# Patient Record
Sex: FEMALE | Race: WHITE | NOT HISPANIC OR LATINO | Employment: FULL TIME | URBAN - NONMETROPOLITAN AREA
[De-identification: names, ages, dates, MRNs, and addresses within clinical notes are randomized per-mention and may not be internally consistent; named-entity substitution may affect disease eponyms.]

---

## 2022-09-03 ENCOUNTER — OFFICE VISIT (OUTPATIENT)
Dept: URGENT CARE | Facility: MEDICAL CENTER | Age: 57
End: 2022-09-03
Payer: COMMERCIAL

## 2022-09-03 VITALS
HEART RATE: 74 BPM | OXYGEN SATURATION: 99 % | WEIGHT: 113.4 LBS | TEMPERATURE: 97.6 F | RESPIRATION RATE: 20 BRPM | DIASTOLIC BLOOD PRESSURE: 91 MMHG | SYSTOLIC BLOOD PRESSURE: 159 MMHG

## 2022-09-03 DIAGNOSIS — H01.004 BLEPHARITIS OF LEFT UPPER EYELID, UNSPECIFIED TYPE: Primary | ICD-10-CM

## 2022-09-03 PROCEDURE — 99203 OFFICE O/P NEW LOW 30 MIN: CPT | Performed by: PHYSICIAN ASSISTANT

## 2022-09-03 RX ORDER — ESCITALOPRAM OXALATE 5 MG/1
5 TABLET ORAL DAILY
COMMUNITY

## 2022-09-03 RX ORDER — ERYTHROMYCIN 5 MG/G
0.5 OINTMENT OPHTHALMIC EVERY 12 HOURS SCHEDULED
Qty: 14 G | Refills: 0 | Status: SHIPPED | OUTPATIENT
Start: 2022-09-03 | End: 2022-09-10

## 2022-09-03 RX ORDER — OFLOXACIN 3 MG/ML
1 SOLUTION/ DROPS OPHTHALMIC 4 TIMES DAILY
Qty: 5 ML | Refills: 0 | Status: SHIPPED | OUTPATIENT
Start: 2022-09-03 | End: 2022-09-03

## 2022-09-03 NOTE — PATIENT INSTRUCTIONS
[facility]  Address: [address] [place] [address] [place], [place], [address]   Phone: [phone]    Continue to monitor symptoms  If new or worsening symptoms develop, go immediately to [facility]  Drink plenty of fluids  Follow up with Eye Doctor this week  Blepharitis   WHAT YOU NEED TO KNOW:   Blepharitis is inflammation of one or both eyelids  Your eyelid, eyelashes, oil glands, or whites of the eye may be affected  DISCHARGE INSTRUCTIONS:   Return to the emergency department if:   You have severe pain  You have vision loss  Call your doctor or ophthalmologist if:   Your vision changes  Your signs and symptoms return or get worse, even after treatment  You have a lump on your eyelid  You have a pus-filled sore on your eyelid  You have questions or concerns about your condition or care  Medicines:   Medicines  can help decrease pain and swelling, or treat an infection  Take your medicine as directed  Contact your healthcare provider if you think your medicine is not helping or if you have side effects  Tell him or her if you are allergic to any medicine  Keep a list of the medicines, vitamins, and herbs you take  Include the amounts, and when and why you take them  Bring the list or the pill bottles to follow-up visits  Carry your medicine list with you in case of an emergency  Manage blepharitis:  Always wash your hands with soap and water before and after eye care:     Use artificial tears  2 times each day if you have dry eye  Apply a warm compress  for 10 minutes 1 to 2 times each day, or as directed  This will loosen crusts and decrease itching and burning  Gently scrub your upper and lower eyelid  2 times each day  This will help open your clogged oil glands and remove pus or other material stuck to your eyelid  Your healthcare provider may recommend a cleaning solution to buy   You can instead make one by putting 2 to 3 drops of baby shampoo in ½ cup warm water     Massage your upper and lower eyelid in small circles for 5 seconds  to loosen oil plugs and decrease inflammation  Do not wear contact lenses or eye makeup  until your eye has healed  Follow up with your doctor or ophthalmologist as directed:  Write down your questions so you remember to ask them during your visits  © [employer] 2022 Information is for End User's use only and may not be sold, redistributed or otherwise used for commercial purposes  All illustrations and images included in CareNotes® are the copyrighted property of [employer]  or [facility]   The above information is an  only  It is not intended as medical advice for individual conditions or treatments  Talk to your doctor, nurse or pharmacist before following any medical regimen to see if it is safe and effective for you